# Patient Record
Sex: FEMALE | Race: WHITE | NOT HISPANIC OR LATINO | URBAN - METROPOLITAN AREA
[De-identification: names, ages, dates, MRNs, and addresses within clinical notes are randomized per-mention and may not be internally consistent; named-entity substitution may affect disease eponyms.]

---

## 2024-07-21 ENCOUNTER — EMERGENCY (EMERGENCY)
Facility: HOSPITAL | Age: 29
LOS: 1 days | Discharge: HOME | End: 2024-07-21
Attending: EMERGENCY MEDICINE | Admitting: STUDENT IN AN ORGANIZED HEALTH CARE EDUCATION/TRAINING PROGRAM
Payer: MEDICAID

## 2024-07-21 VITALS
OXYGEN SATURATION: 97 % | HEIGHT: 62 IN | RESPIRATION RATE: 18 BRPM | DIASTOLIC BLOOD PRESSURE: 77 MMHG | WEIGHT: 199.96 LBS | SYSTOLIC BLOOD PRESSURE: 115 MMHG | HEART RATE: 83 BPM | TEMPERATURE: 98 F

## 2024-07-21 PROCEDURE — 29130 APPL FINGER SPLINT STATIC: CPT | Mod: F4

## 2024-07-21 PROCEDURE — 99284 EMERGENCY DEPT VISIT MOD MDM: CPT | Mod: 25

## 2024-07-21 PROCEDURE — 73140 X-RAY EXAM OF FINGER(S): CPT

## 2024-07-21 PROCEDURE — 73140 X-RAY EXAM OF FINGER(S): CPT | Mod: 26,RT

## 2024-07-21 PROCEDURE — 99283 EMERGENCY DEPT VISIT LOW MDM: CPT

## 2024-07-21 RX ADMIN — Medication 600 MILLIGRAM(S): at 15:10

## 2024-07-21 RX ADMIN — Medication 600 MILLIGRAM(S): at 14:34

## 2024-07-21 NOTE — ED ADULT NURSE NOTE - OBJECTIVE STATEMENT
Pt came in from home with c/o right 4th finger injury from a car door yesterday. pt reports pain worsens when she moves it. Denies any PMH.

## 2024-07-21 NOTE — ED PROVIDER NOTE - NS ED ROS FT
Except as otherwise indicated in HPI:  CONSTITUTIONAL: Neg  MSK: +finger pain  SKIN: Neg  NEURO: Neg  Heme/Onc: Neg

## 2024-07-21 NOTE — ED ADULT NURSE NOTE - CAS EDN DISCHARGE INTERVENTIONS
"Cheryl"John Gonzalez was seen and treated in our emergency department on 5/21/2023.  She may return to work on 05/23/2023.       If you have any questions or concerns, please don't hesitate to call.      Cristian Leigh MD" IV discontinued, cath removed intact

## 2024-07-21 NOTE — ED ADULT TRIAGE NOTE - BIRTH SEX
Bedside shift change report given to Selestine Romberg, RN (oncoming nurse) by [facility]. (offgoing nurse). Report included the following information SBAR, Kardex, Intake/Output, Recent Results, Med Rec Status and Cardiac Rhythm NSR with occassional PVCs. 0800 Assessment done; patient repositioned; routine meds given. Intake 25% of breakfast.    1000 Patient repositioned at this time. 1200 Dr Field Plunk in to see patient- ceftriaxone d/c'd; ceftin PO ordered. Plan to discharge patient back to [facility]. Female

## 2024-07-21 NOTE — ED PROVIDER NOTE - OBJECTIVE STATEMENT
Patient with no significant past medical history complains of right ring finger pain.  Yesterday caught finger in door but the door did not fully close but the finger got jammed.  Complains of pain and swelling over the proximal aspect of the right ring finger.  Has not taken any medication for pain.  Denies numbness or tingling.  Has pain with direct pressure and when she tries to flex the finger and make a fist.

## 2024-07-21 NOTE — ED ADULT NURSE NOTE - NSFALLUNIVINTERV_ED_ALL_ED
Bed/Stretcher in lowest position, wheels locked, appropriate side rails in place/Call bell, personal items and telephone in reach/Instruct patient to call for assistance before getting out of bed/chair/stretcher/Non-slip footwear applied when patient is off stretcher/[place] to call system/Physically safe environment - no spills, clutter or unnecessary equipment/Purposeful proactive rounding/Room/bathroom lighting operational, light cord in reach

## 2024-07-21 NOTE — ED PROVIDER NOTE - CARE PROVIDER_API CALL
Aarti Knowles  Orthopaedic Surgery  [address] [place] [id number]  Phone: [phone]  Fax: [phone]  Follow Up Time:

## 2024-07-21 NOTE — ED PROVIDER NOTE - PHYSICAL EXAMINATION
Gen:  alert, awake, no acute distress  Head:  atraumatic, normocephalic  MSK:  moving all extremities, +ttp right 4th finger pip joint, full rom with pain, strength and sensation intact, cap refill <2sec  Neuro:  grossly intact, no focal deficits  Skin:  clear, dry, intact  Psych: AOx3, normal affect, no apparent risk to self or others

## 2024-07-21 NOTE — ED PROVIDER NOTE - NSFOLLOWUPINSTRUCTIONS_ED_ALL_ED_FT
Wear splint at all time  Take ibuprofen 400 mg by mouth every 6 hours as needed for pain  Follow-up with orthopedic specialist in 1-2 days  Return to emergency department if numbness, worsening pain, color change in finger, or other symptoms    Finger Sprain, Adult  A finger sprain is a tear or stretch in a ligament in a finger. Ligaments are tissues that connect bones to each other.    What are the causes?  Finger sprains happen when something makes the bones in the hand move in an abnormal way. They are often caused by a fall or an accident.    What increases the risk?  This condition is more likely to develop in people who:  Participate in sports in which it is easy to fall, such as skiing.  Play sports that involve catching an object, such as basketball.  Have poor strength and flexibility.  What are the signs or symptoms?  Symptoms of this condition include:  Pain or tenderness in the finger.  Swelling in the finger.  A bluish appearance to the finger.  Bruising.  Difficulty bending and flexing the finger.  How is this diagnosed?  This condition is diagnosed with an exam of your finger. Your health care provider may take an X-ray to see if any bones are broken or dislocated.    How is this treated?  Hand showing finger splint on index and middle fingers and wrist.  Treatment for this condition depends on how severe the sprain is. It may involve:  Preventing the finger from moving for a period of time. Your finger may be wrapped in a bandage (dressing) or splint, or your finger may be taped to the fingers beside it (buddy taping).  Medicines for pain.  Exercises to strengthen the finger. These may be recommended when the finger has healed.  Surgery to reconnect the ligament to a bone. This may be done if the ligament was completely torn.  Follow these instructions at home:  If you have a removable splint:    Wear the splint as told by your health care provider. Remove it only as told by your health care provider.  Check the skin around the splint every day. Tell your health care provider about any concerns.  Loosen the splint if your fingers tingle, become numb, or turn cold and blue.  Keep the splint clean.  If the splint is not waterproof:  Do not let it get wet.  Cover it with a watertight covering when you take a bath or shower.  Managing pain, stiffness, and swelling    If directed, put ice on the injured area. To do this:  If you have a removable splint, remove it as told by your health care provider.  Put ice in a plastic bag.  Place a towel between your skin and the bag.  Leave the ice on for 20 minutes, 2–3 times a day.  Remove the ice if your skin turns bright red. This is very important. If you cannot feel pain, heat, or cold, you have a greater risk of damage to the area.  Move your fingers often to reduce stiffness and swelling.  Raise (elevate) the injured area above the level of your heart while you are sitting or lying down.  Medicines    Take over-the-counter and prescription medicines only as told by your health care provider.  Ask your health care provider if the medicine prescribed to you requires you to avoid driving or using machinery.  General instructions    Keep any dressings dry until your health care provider says they can be removed.  If your fingers are buddy taped, replace your buddy taping as told by your health care provider.  Do exercises as told by your health care provider or physical therapist.  Do not wear rings on your injured finger.  Keep all follow-up visits. This is important.  Contact a health care provider if:  Your pain is not controlled with medicine.  Your bruising or swelling gets worse.  Your splint is damaged.  You develop a fever.  Get help right away if:  Your finger is numb or blue.  Your finger feels colder to the touch than normal.  Summary  A finger sprain is a tear or stretch in a ligament in a finger. Ligaments are tissues that connect bones to each other.  Finger sprains happen when something makes the bones in the hand move in an abnormal way. They are often caused by a fall or accident.  This condition is diagnosed with an exam of your finger. Your health care provider may do an X-ray to see if any bones are broken or dislocated.  Treatment for this condition depends on how severe the sprain is. Treatment may involve buddy taping or wearing a splint. Surgery to reconnect the ligament to a bone may be needed if the ligament was torn all the way.

## 2024-07-21 NOTE — ED PROVIDER NOTE - CLINICAL SUMMARY MEDICAL DECISION MAKING FREE TEXT BOX
Patient with no significant past medical history complains of right ring finger pain.  Yesterday caught finger in door but the door did not fully close but the finger got jammed.  Complains of pain and swelling over the proximal aspect of the right ring finger.  Has not taken any medication for pain.  Denies numbness or tingling.  Has pain with direct pressure and when she tries to flex the finger and make a fist.    analgesia, xray, splint, ortho f/u Patient with no significant past medical history complains of right ring finger pain.  Yesterday caught finger in door but the door did not fully close but the finger got jammed.  Complains of pain and swelling over the proximal aspect of the right ring finger.  Has not taken any medication for pain.  Denies numbness or tingling.  Has pain with direct pressure and when she tries to flex the finger and make a fist.    analgesia, xray, splint, ortho f/u    No fracture on x-ray, patient placed in splint, stable for Ortho follow-up

## 2024-07-21 NOTE — ED PROVIDER NOTE - PATIENT PORTAL LINK FT
You can access the FollowMyHealth Patient Portal offered by [facility] by registering at the following website: [email or web address]. By joining [employer] [facility]’s FollowMyHealth portal, you will also be able to view your health information using other applications (apps) compatible with our system.